# Patient Record
Sex: MALE | URBAN - METROPOLITAN AREA
[De-identification: names, ages, dates, MRNs, and addresses within clinical notes are randomized per-mention and may not be internally consistent; named-entity substitution may affect disease eponyms.]

---

## 2022-09-15 ENCOUNTER — OFFICE VISIT (OUTPATIENT)
Dept: URBAN - METROPOLITAN AREA CLINIC 91 | Facility: CLINIC | Age: 42
End: 2022-09-15
Payer: COMMERCIAL

## 2022-09-15 DIAGNOSIS — H52.4 PRESBYOPIA: ICD-10-CM

## 2022-09-15 DIAGNOSIS — H04.123 DRY EYE SYNDROME OF BILATERAL LACRIMAL GLANDS: ICD-10-CM

## 2022-09-15 DIAGNOSIS — H53.2 DIPLOPIA: Primary | ICD-10-CM

## 2022-09-15 PROCEDURE — 99203 OFFICE O/P NEW LOW 30 MIN: CPT | Performed by: SPECIALIST

## 2022-09-15 ASSESSMENT — VISUAL ACUITY
OS: 20/20
OD: 20/20

## 2022-09-15 NOTE — IMPRESSION/PLAN
Impression: Diplopia: H53.2. Plan: Double vision is monocular OD worse then OS. Explained to patient glasses are needed. New MRx done today ok to dispense.